# Patient Record
Sex: FEMALE | Race: WHITE | URBAN - METROPOLITAN AREA
[De-identification: names, ages, dates, MRNs, and addresses within clinical notes are randomized per-mention and may not be internally consistent; named-entity substitution may affect disease eponyms.]

---

## 2017-11-25 ENCOUNTER — HOSPITAL ENCOUNTER (EMERGENCY)
Facility: HOSPITAL | Age: 32
Discharge: HOME | End: 2017-11-25
Attending: PHYSICIAN ASSISTANT
Payer: COMMERCIAL

## 2017-11-25 VITALS
RESPIRATION RATE: 18 BRPM | SYSTOLIC BLOOD PRESSURE: 115 MMHG | OXYGEN SATURATION: 100 % | HEIGHT: 69 IN | HEART RATE: 88 BPM | DIASTOLIC BLOOD PRESSURE: 66 MMHG | BODY MASS INDEX: 21.48 KG/M2 | TEMPERATURE: 98 F | WEIGHT: 145 LBS

## 2017-11-25 DIAGNOSIS — Z34.90 PREGNANCY, UNSPECIFIED GESTATIONAL AGE: ICD-10-CM

## 2017-11-25 DIAGNOSIS — R11.2 NAUSEA VOMITING AND DIARRHEA: Primary | ICD-10-CM

## 2017-11-25 DIAGNOSIS — R19.7 NAUSEA VOMITING AND DIARRHEA: Primary | ICD-10-CM

## 2017-11-25 PROCEDURE — 85025 COMPLETE CBC W/AUTO DIFF WBC: CPT

## 2017-11-25 PROCEDURE — 85025 COMPLETE CBC W/AUTO DIFF WBC: CPT | Performed by: PHYSICIAN ASSISTANT

## 2017-11-25 PROCEDURE — 96360 HYDRATION IV INFUSION INIT: CPT

## 2017-11-25 PROCEDURE — 81001 URINALYSIS AUTO W/SCOPE: CPT | Performed by: PHYSICIAN ASSISTANT

## 2017-11-25 PROCEDURE — 96361 HYDRATE IV INFUSION ADD-ON: CPT

## 2017-11-25 PROCEDURE — 80048 BASIC METABOLIC PNL TOTAL CA: CPT | Performed by: PHYSICIAN ASSISTANT

## 2017-11-25 PROCEDURE — 81001 URINALYSIS AUTO W/SCOPE: CPT

## 2017-11-25 PROCEDURE — 99284 EMERGENCY DEPT VISIT MOD MDM: CPT

## 2017-11-25 PROCEDURE — 80048 BASIC METABOLIC PNL TOTAL CA: CPT

## 2017-11-25 RX ORDER — METOCLOPRAMIDE 10 MG/1
10 TABLET ORAL 3 TIMES DAILY PRN
Qty: 20 TABLET | Refills: 0 | Status: SHIPPED | OUTPATIENT
Start: 2017-11-25 | End: 2017-11-30

## 2017-11-25 NOTE — ED INITIAL ASSESSMENT (HPI)
Pt states she woke up at 0200 with n/v/d. Reports \"sweats and chills\". Pt is 11 weeks pregnant, denies any vaginal bleeding or any other pregnancy complaints.

## 2017-11-25 NOTE — ED NOTES
Pt tolerating po fluids, no further vomiting. Still mildly tachycardic. Judah Carvajal made aware, 2nd 0.9 1000ml bolus infusing.

## 2017-11-25 NOTE — ED PROVIDER NOTES
Patient Seen in: [facility] AND [facility] Emergency Department    History   Patient presents with:  Nausea/Vomiting/Diarrhea (gastrointestinal)    Stated Complaint: 11 wks pregnant- nausea/vomiting/diarrhea since 2 am.     MAYRA Lynn is a 33 year °F (36.8 °C) (Oral)   Resp 18   Ht 175.3 cm (5' 9\")   Wt 65.8 kg   SpO2 100%   BMI 21.41 kg/m²     PULSE OX within normal limits on room air as interpreted by this provider. Physical Exam    Constitutional: The patient is cooperative.  Appears well- 273 (*)     All other components within normal limits   CBC W/ DIFFERENTIAL - Abnormal; Notable for the following:     Neutrophil Absolute 8.9 (*)     Lymphocyte Absolute 0.4 (*)     All other components within normal limits   CBC WITH DIFFERENTIAL WITH PL

## (undated) NOTE — ED AVS SNAPSHOT
José Luis Palacio   MRN: [id number]    Department:  [facility] Emergency Department   Date of Visit:  11/25/2017           Disclosure     Insurance plans vary and the physician(s) referred by the ER may not be covered by your plan.  Please cont CARE PHYSICIAN AT ONCE OR RETURN IMMEDIATELY TO THE EMERGENCY DEPARTMENT. If you have been prescribed any medication(s), please fill your prescription right away and begin taking the medication(s) as directed.   If you believe that any of the medications